# Patient Record
Sex: FEMALE | Race: ASIAN | Employment: FULL TIME | URBAN - METROPOLITAN AREA
[De-identification: names, ages, dates, MRNs, and addresses within clinical notes are randomized per-mention and may not be internally consistent; named-entity substitution may affect disease eponyms.]

---

## 2017-10-16 ENCOUNTER — HOSPITAL ENCOUNTER (OUTPATIENT)
Dept: MAMMOGRAPHY | Age: 42
Discharge: HOME | End: 2017-10-16
Attending: FAMILY MEDICINE
Payer: OTHER GOVERNMENT

## 2017-10-16 DIAGNOSIS — Z12.31 VISIT FOR SCREENING MAMMOGRAM: ICD-10-CM

## 2017-10-16 PROCEDURE — 77067 SCR MAMMO BI INCL CAD: CPT

## 2017-11-13 ENCOUNTER — OFFICE VISIT (OUTPATIENT)
Dept: FAMILY MEDICINE CLINIC | Age: 42
End: 2017-11-13

## 2017-11-13 VITALS
HEART RATE: 76 BPM | RESPIRATION RATE: 16 BRPM | TEMPERATURE: 98.2 F | OXYGEN SATURATION: 99 % | HEIGHT: 61 IN | SYSTOLIC BLOOD PRESSURE: 100 MMHG | DIASTOLIC BLOOD PRESSURE: 62 MMHG | WEIGHT: 126 LBS | BODY MASS INDEX: 23.79 KG/M2

## 2017-11-13 DIAGNOSIS — R42 VERTIGO: ICD-10-CM

## 2017-11-13 DIAGNOSIS — Z13.1 SCREENING FOR DIABETES MELLITUS (DM): ICD-10-CM

## 2017-11-13 DIAGNOSIS — J01.00 ACUTE NON-RECURRENT MAXILLARY SINUSITIS: Primary | ICD-10-CM

## 2017-11-13 DIAGNOSIS — R51.9 FACIAL PAIN: ICD-10-CM

## 2017-11-13 DIAGNOSIS — Z13.220 SCREENING FOR LIPID DISORDERS: ICD-10-CM

## 2017-11-13 RX ORDER — AMOXICILLIN AND CLAVULANATE POTASSIUM 875; 125 MG/1; MG/1
1 TABLET, FILM COATED ORAL EVERY 12 HOURS
Qty: 20 TAB | Refills: 0 | Status: SHIPPED | OUTPATIENT
Start: 2017-11-13 | End: 2017-11-23

## 2017-11-13 RX ORDER — FLUTICASONE PROPIONATE 50 MCG
SPRAY, SUSPENSION (ML) NASAL
Qty: 1 BOTTLE | Refills: 0 | Status: SHIPPED | OUTPATIENT
Start: 2017-11-13

## 2017-11-13 RX ORDER — DOXYCYCLINE 100 MG/1
CAPSULE ORAL
COMMUNITY
Start: 2017-11-08

## 2017-11-13 RX ORDER — MECLIZINE HYDROCHLORIDE 25 MG/1
25 TABLET ORAL
Qty: 12 TAB | Refills: 0 | Status: SHIPPED | OUTPATIENT
Start: 2017-11-13 | End: 2017-11-23

## 2017-11-13 NOTE — PROGRESS NOTES
1. Have you been to the ER, urgent care clinic since your last visit? Hospitalized since your last visit? Yes [facility] 11/10/2017    2. Have you seen or consulted any other health care providers outside of the [address] [place] [address] since your last visit? Include any pap smears or colon screening.  No

## 2017-11-13 NOTE — PROGRESS NOTES
Bettejane Sandhoff, 43 y.o.,  female    SUBJECTIVE  Left sided face pain x 1 week    Reports feeling pressure on L frontal and maxillary sinuses, nasal congestion no discharge, feeling underwater. No fever, cough. Says similar symptoms last year responded to flonase. [facility] reviewed note given doxy and flu vaccine. Says feeling worse, developed dizziness, sensation of imbalance, room spinning past 2 days. ROS:  See HPI, all others negative        Patient Active Problem List   Diagnosis Code   (none) - all problems resolved or deleted       Current Outpatient Prescriptions   Medication Sig Dispense Refill    doxycycline (VIBRAMYCIN) 100 mg capsule       amoxicillin-clavulanate (AUGMENTIN) 875-125 mg per tablet Take 1 Tab by mouth every twelve (12) hours for 10 days. 20 Tab 0    fluticasone (FLONASE) 50 mcg/actuation nasal spray 2 sprays each nostril once daily 1 Bottle 0    meclizine (ANTIVERT) 25 mg tablet Take 1 Tab by mouth three (3) times daily as needed for up to 10 days. 12 Tab 0    guaiFENesin-codeine (ROBITUSSIN AC) 100-10 mg/5 mL solution Take 1-2 teaspoons by mouth every 4 hours PRN for cough      butalbital-aspirin-caffeine (FIORINAL) capsule Take  by mouth every four (4) hours as needed for Pain. Take 1-2 tabs      ibuprofen (MOTRIN) 600 mg tablet Take 1 Tab by mouth every eight (8) hours as needed for Pain. Indications: PAIN 15 Tab 0    predniSONE (STERAPRED) 5 mg dose pack See administration instruction per 5mg dose pack 21 Tab 0    vit B Cmplx 3-FA-Vit C-Biotin (NEPHRO CARLOS MANUEL RX) 1- mg-mg-mcg tablet Take 1 Tab by mouth daily.  Glucosamine &Chondroit-MV-Min3 860-303-72-0.5 mg tab Take  by mouth.          No Known Allergies    Past Medical History:   Diagnosis Date    DM (diabetes mellitus), gestational, postpartum condition 2010       Social History     Social History    Marital status:      Spouse name: N/A    Number of children: N/A    Years of education: N/A     Occupational History    Not on file. Social History Main Topics    Smoking status: Former Smoker     Packs/day: 1.00     Years: 5.00     Quit date: 4/1/2005    Smokeless tobacco: Never Used    Alcohol use Yes      Comment: occ    Drug use: No    Sexual activity: Yes     Partners: Male     Other Topics Concern    Not on file     Social History Narrative       Family History   Problem Relation Age of Onset    Diabetes Mother     Cancer Mother      pancreatatic    Hypertension Father          OBJECTIVE    Physical Exam:     Visit Vitals    /62 (BP 1 Location: Left arm, BP Patient Position: Sitting)    Pulse 76    Temp 98.2 °F (36.8 °C) (Oral)    Resp 16    Ht 5' 1\" (1.549 m)    Wt 126 lb (57.2 kg)    LMP 11/07/2017 (Exact Date)    SpO2 99%    BMI 23.81 kg/m2       General: alert, mildly ill-appearing,  in no apparent distress or pain  Head: atraumatic. + tender L maxillary and frontal sinuses  Eyes: Lids with no discharge, no matting, conjunctivae clear and non injected, full EOMs, PERLLA  Ears: pinna non-tender, external auditory canal patent, TM intact  Mouth/throat:tonsils non enlarged, pharynx non erythematous and no lesion, nasal mucosa boggy  Neck: supple, no adenopathy palpated  CVS: normal rate, regular rhythm, distinct S1 and S2  Lungs:clear to ausculation bilaterally, no crackles, wheezing or rhonchi noted  Skin: warm, no lesions, rashes noted  Psych:  mood and affect normal        ASSESSMENT/PLAN  Diagnoses and all orders for this visit:    1. Acute non-recurrent maxillary sinusitis  Persistent  Stop doxy,  Switch to-     amoxicillin-clavulanate (AUGMENTIN) 875-125 mg per tablet; Take 1 Tab by mouth every twelve (12) hours for 10 days. Start:  -     fluticasone (FLONASE) 50 mcg/actuation nasal spray; 2 sprays each nostril once daily  Consider allergic rhinitis with timing of symptoms, advised flonase/zyrtec by fall season.      2. Screening for diabetes mellitus (DM)  -     METABOLIC PANEL, COMPREHENSIVE; Future  -     HEMOGLOBIN A1C W/O EAG; Future    3. Screening for lipid disorders  -     LIPID PANEL; Future    4. Vertigo  -     meclizine (ANTIVERT) 25 mg tablet; Take 1 Tab by mouth three (3) times daily as needed for up to 10 days. 5. Facial pain      Follow-up Disposition:  Return in about 1 week (around 11/20/2017), or plan for CPE then. .      Patient understands plan of care. Patient has provided input and agrees with goals.

## 2017-11-13 NOTE — MR AVS SNAPSHOT
Visit Information Date & Time Provider Department Dept. Phone Encounter #  
 11/13/2017  1:00 PM Gabriel Mike, [address] [id number] Follow-up Instructions Return in about 1 week (around 11/20/2017), or plan for CPE then. Meir Edvinedmundos Upcoming Health Maintenance Date Due  
 PAP AKA CERVICAL CYTOLOGY 9/18/2018 DTaP/Tdap/Td series (2 - Td) 8/6/2023 Allergies as of 11/13/2017  Review Complete On: 12/6/2016 By: Yaw Painting LPN No Known Allergies Current Immunizations  Reviewed on 11/1/2016 Name Date Influenza Vaccine (Quad) PF 11/1/2016, 9/18/2015 TB Skin Test (PPD) Intradermal 3/18/2016 Tdap 8/6/2013 Not reviewed this visit You Were Diagnosed With   
  
 Codes Comments Acute non-recurrent maxillary sinusitis    -  Primary ICD-10-CM: J01.00 ICD-9-CM: 461.0 Screening for diabetes mellitus (DM)     ICD-10-CM: Z13.1 ICD-9-CM: V77.1 Screening for lipid disorders     ICD-10-CM: Z13.220 ICD-9-CM: V77.91 Vertigo     ICD-10-CM: T22 ICD-9-CM: 780.4 Vitals BP Pulse Temp Resp Height(growth percentile) Weight(growth percentile) 100/62 (BP 1 Location: Left arm, BP Patient Position: Sitting) 76 98.2 °F (36.8 °C) (Oral) 16 5' 1\" (1.549 m) 126 lb (57.2 kg) LMP SpO2 BMI OB Status Smoking Status 11/07/2017 (Exact Date) 99% 23.81 kg/m2 Having regular periods Former Smoker Vitals History BMI and BSA Data Body Mass Index Body Surface Area  
 23.81 kg/m 2 1.57 m 2 Preferred Pharmacy Pharmacy Name Phone [id number] - [address], [address] [place] [address] [phone] Your Updated Medication List  
  
   
This list is accurate as of: 11/13/17  1:16 PM.  Always use your most recent med list.  
  
  
  
  
 amoxicillin-clavulanate 875-125 mg per tablet Commonly known as:  AUGMENTIN Take 1 Tab by mouth every twelve (12) hours for 10 days. doxycycline 100 mg capsule Commonly known as:  VIBRAMYCIN  
  
 FIORINAL capsule Generic drug:  butalbital-aspirin-caffeine Take  by mouth every four (4) hours as needed for Pain. Take 1-2 tabs  
  
 fluticasone 50 mcg/actuation nasal spray Commonly known as:  FLONASE  
2 sprays each nostril once daily Glucosamine &Chondroit-MV-Min3 151-999-03-0.5 mg Tab Take  by mouth.  
  
 guaiFENesin-codeine 100-10 mg/5 mL solution Commonly known as:  ROBITUSSIN AC Take 1-2 teaspoons by mouth every 4 hours PRN for cough  
  
 ibuprofen 600 mg tablet Commonly known as:  MOTRIN Take 1 Tab by mouth every eight (8) hours as needed for Pain. Indications: PAIN  
  
 meclizine 25 mg tablet Commonly known as:  ANTIVERT Take 1 Tab by mouth three (3) times daily as needed for up to 10 days. predniSONE 5 mg dose pack Commonly known as:  STERAPRED See administration instruction per 5mg dose pack  
  
 vit B Cmplx 3-FA-Vit C-Biotin 1- mg-mg-mcg tablet Commonly known as:  NEPHRO CARLOS MANUEL RX Take 1 Tab by mouth daily. Prescriptions Sent to Pharmacy Refills  
 amoxicillin-clavulanate (AUGMENTIN) 875-125 mg per tablet 0 Sig: Take 1 Tab by mouth every twelve (12) hours for 10 days. Class: Normal  
 Pharmacy: [address] at [address] Ph #: [phone] Route: Oral  
 fluticasone (FLONASE) 50 mcg/actuation nasal spray 0 Si sprays each nostril once daily Class: Normal  
 Pharmacy: [address] at [address] [place] [address] Ph #: [phone]  
 meclizine (ANTIVERT) 25 mg tablet 0 Sig: Take 1 Tab by mouth three (3) times daily as needed for up to 10 days. Class: Normal  
 Pharmacy: [address] at [address] Ph #: [phone] Route: Oral  
  
Follow-up Instructions Return in about 1 week (around 11/20/2017), or plan for CPE then. Brant Prieto To-Do List   
 11/13/2017 Lab:  HEMOGLOBIN A1C W/O EAG   
  
 11/13/2017 Lab:  LIPID PANEL   
  
 11/13/2017 Lab:  METABOLIC PANEL, COMPREHENSIVE Patient Instructions Sinusitis: Care Instructions Your Care Instructions Sinusitis is an infection of the lining of the sinus cavities in your head. Sinusitis often follows a cold. It causes pain and pressure in your head and face. In most cases, sinusitis gets better on its own in 1 to 2 weeks. But some mild symptoms may last for several weeks. Sometimes antibiotics are needed. Follow-up care is a key part of your treatment and safety. Be sure to make and go to all appointments, and call your doctor if you are having problems. It's also a good idea to know your test results and keep a list of the medicines you take. How can you care for yourself at home? · Take an over-the-counter pain medicine, such as acetaminophen (Tylenol), ibuprofen (Advil, Motrin), or naproxen (Aleve). Read and follow all instructions on the label. · If the doctor prescribed antibiotics, take them as directed. Do not stop taking them just because you feel better. You need to take the full course of antibiotics. · Be careful when taking over-the-counter cold or flu medicines and Tylenol at the same time. Many of these medicines have acetaminophen, which is Tylenol. Read the labels to make sure that you are not taking more than the recommended dose. Too much acetaminophen (Tylenol) can be harmful. · Breathe warm, moist air from a steamy shower, a hot bath, or a sink filled with hot water. Avoid cold, dry air. Using a humidifier in your home may help. Follow the directions for cleaning the machine. · Use saline (saltwater) nasal washes to help keep your nasal passages open and wash out mucus and bacteria.  You can buy saline nose drops at a grocery store or drugstore. Or you can make your own at home by adding 1 teaspoon of salt and 1 teaspoon of baking soda to 2 cups of distilled water. If you make your own, fill a bulb syringe with the solution, insert the tip into your nostril, and squeeze gently. Joe Lien your nose. · Put a hot, wet towel or a warm gel pack on your face 3 or 4 times a day for 5 to 10 minutes each time. · Try a decongestant nasal spray like oxymetazoline (Afrin). Do not use it for more than 3 days in a row. Using it for more than 3 days can make your congestion worse. When should you call for help? Call your doctor now or seek immediate medical care if: 
? · You have new or worse swelling or redness in your face or around your eyes. ? · You have a new or higher fever. ? Watch closely for changes in your health, and be sure to contact your doctor if: 
? · You have new or worse facial pain. ? · The mucus from your nose becomes thicker (like pus) or has new blood in it. ? · You are not getting better as expected. Where can you learn more? Go to http://dennis-lalitha.info/. Enter G600 in the search box to learn more about \"Sinusitis: Care Instructions. \" Current as of: May 12, 2017 Content Version: 11.4 © 6434-3122 [employer]. Care instructions adapted under license by [employer] (which disclaims liability or warranty for this information). If you have questions about a medical condition or this instruction, always ask your healthcare professional. [place] [address] any warranty or liability for your use of this information. Introducing [facility]! Ana Lilia Ornleas introduces [employer] patient portal. Now you can access parts of your medical record, email your doctor's office, and request medication refills online. 1. In your internet browser, go to [email or web address] 2. Click on the First Time User? Click Here link in the Sign In box. You will see the New Member Sign Up page. 3. Enter your [place] Access Code exactly as it appears below. You will not need to use this code after youve completed the sign-up process. If you do not sign up before the expiration date, you must request a new code. · [place] Access Code: [email or web address] Expires: 12/19/2017  3:55 PM 
 
4. Enter the last four digits of your Social Security Number (xxxx) and Date of Birth (mm/dd/yyyy) as indicated and click Submit. You will be taken to the next sign-up page. 5. Create a [place] ID. This will be your [place] login ID and cannot be changed, so think of one that is secure and easy to remember. 6. Create a [place] password. You can change your password at any time. 7. Enter your Password Reset Question and Answer. This can be used at a later time if you forget your password. 8. Enter your e-mail address. You will receive e-mail notification when new information is available in [address] 9. Click Sign Up. You can now view and download portions of your medical record. 10. Click the Download Summary menu link to download a portable copy of your medical information. If you have questions, please visit the Frequently Asked Questions section of the [place] website. Remember, [place] is NOT to be used for urgent needs. For medical emergencies, dial 911. Now available from your iPhone and Android! Please provide this summary of care documentation to your next provider. Your primary care clinician is listed as Aaron Porter. If you have any questions after today's visit, please call [phone].

## 2017-11-13 NOTE — PATIENT INSTRUCTIONS
Sinusitis: Care Instructions  Your Care Instructions    Sinusitis is an infection of the lining of the sinus cavities in your head. Sinusitis often follows a cold. It causes pain and pressure in your head and face. In most cases, sinusitis gets better on its own in 1 to 2 weeks. But some mild symptoms may last for several weeks. Sometimes antibiotics are needed. Follow-up care is a key part of your treatment and safety. Be sure to make and go to all appointments, and call your doctor if you are having problems. It's also a good idea to know your test results and keep a list of the medicines you take. How can you care for yourself at home? · Take an over-the-counter pain medicine, such as acetaminophen (Tylenol), ibuprofen (Advil, Motrin), or naproxen (Aleve). Read and follow all instructions on the label. · If the doctor prescribed antibiotics, take them as directed. Do not stop taking them just because you feel better. You need to take the full course of antibiotics. · Be careful when taking over-the-counter cold or flu medicines and Tylenol at the same time. Many of these medicines have acetaminophen, which is Tylenol. Read the labels to make sure that you are not taking more than the recommended dose. Too much acetaminophen (Tylenol) can be harmful. · Breathe warm, moist air from a steamy shower, a hot bath, or a sink filled with hot water. Avoid cold, dry air. Using a humidifier in your home may help. Follow the directions for cleaning the machine. · Use saline (saltwater) nasal washes to help keep your nasal passages open and wash out mucus and bacteria. You can buy saline nose drops at a grocery store or drugstore. Or you can make your own at home by adding 1 teaspoon of salt and 1 teaspoon of baking soda to 2 cups of distilled water. If you make your own, fill a bulb syringe with the solution, insert the tip into your nostril, and squeeze gently. Lorn Hush your nose.   · Put a hot, wet towel or a warm gel pack on your face 3 or 4 times a day for 5 to 10 minutes each time. · Try a decongestant nasal spray like oxymetazoline (Afrin). Do not use it for more than 3 days in a row. Using it for more than 3 days can make your congestion worse. When should you call for help? Call your doctor now or seek immediate medical care if:  ? · You have new or worse swelling or redness in your face or around your eyes. ? · You have a new or higher fever. ? Watch closely for changes in your health, and be sure to contact your doctor if:  ? · You have new or worse facial pain. ? · The mucus from your nose becomes thicker (like pus) or has new blood in it. ? · You are not getting better as expected. Where can you learn more? Go to http://dennis-lalitha.info/. Enter A011 in the search box to learn more about \"Sinusitis: Care Instructions. \"  Current as of: May 12, 2017  Content Version: 11.4  © 6822-7068 [employer]. Care instructions adapted under license by [employer] (which disclaims liability or warranty for this information). If you have questions about a medical condition or this instruction, always ask your healthcare professional. [address] any warranty or liability for your use of this information.

## 2017-11-13 NOTE — PROGRESS NOTES
Patient identified with 2 identifiers (name and ).   Patient aware she needs to schedule WWE for 2017

## 2017-11-14 ENCOUNTER — HOSPITAL ENCOUNTER (OUTPATIENT)
Dept: LAB | Age: 42
Discharge: HOME | End: 2017-11-14
Payer: OTHER GOVERNMENT

## 2017-11-14 LAB
ALBUMIN SERPL-MCNC: 3.5 G/DL (ref 3.4–5)
ALBUMIN/GLOB SERPL: 0.9 {RATIO} (ref 0.8–1.7)
ALP SERPL-CCNC: 53 U/L (ref 45–117)
ALT SERPL-CCNC: 41 U/L (ref 13–56)
ANION GAP SERPL CALC-SCNC: 7 MMOL/L (ref 3–18)
AST SERPL-CCNC: 24 U/L (ref 15–37)
BILIRUB SERPL-MCNC: 0.5 MG/DL (ref 0.2–1)
BUN SERPL-MCNC: 12 MG/DL (ref 7–18)
BUN/CREAT SERPL: 20 (ref 12–20)
CALCIUM SERPL-MCNC: 8.5 MG/DL (ref 8.5–10.1)
CHLORIDE SERPL-SCNC: 101 MMOL/L (ref 100–108)
CHOLEST SERPL-MCNC: 195 MG/DL
CO2 SERPL-SCNC: 30 MMOL/L (ref 21–32)
CREAT SERPL-MCNC: 0.61 MG/DL (ref 0.6–1.3)
GLOBULIN SER CALC-MCNC: 3.8 G/DL (ref 2–4)
GLUCOSE SERPL-MCNC: 79 MG/DL (ref 74–99)
HBA1C MFR BLD: 5.5 % (ref 4.2–5.6)
HDLC SERPL-MCNC: 64 MG/DL (ref 40–60)
HDLC SERPL: 3 {RATIO} (ref 0–5)
LDLC SERPL CALC-MCNC: 103.6 MG/DL (ref 0–100)
LIPID PROFILE,FLP: ABNORMAL
POTASSIUM SERPL-SCNC: 4.4 MMOL/L (ref 3.5–5.5)
PROT SERPL-MCNC: 7.3 G/DL (ref 6.4–8.2)
SODIUM SERPL-SCNC: 138 MMOL/L (ref 136–145)
TRIGL SERPL-MCNC: 137 MG/DL (ref ?–150)
VLDLC SERPL CALC-MCNC: 27.4 MG/DL

## 2017-11-14 PROCEDURE — 83036 HEMOGLOBIN GLYCOSYLATED A1C: CPT | Performed by: FAMILY MEDICINE

## 2017-11-14 PROCEDURE — 80053 COMPREHEN METABOLIC PANEL: CPT | Performed by: FAMILY MEDICINE

## 2017-11-14 PROCEDURE — 36415 COLL VENOUS BLD VENIPUNCTURE: CPT | Performed by: FAMILY MEDICINE

## 2017-11-14 PROCEDURE — 80061 LIPID PANEL: CPT | Performed by: FAMILY MEDICINE

## 2017-11-20 ENCOUNTER — HOSPITAL ENCOUNTER (OUTPATIENT)
Dept: LAB | Age: 42
Discharge: HOME | End: 2017-11-20
Payer: OTHER GOVERNMENT

## 2017-11-20 ENCOUNTER — OFFICE VISIT (OUTPATIENT)
Dept: FAMILY MEDICINE CLINIC | Age: 42
End: 2017-11-20

## 2017-11-20 VITALS
TEMPERATURE: 97 F | OXYGEN SATURATION: 99 % | HEIGHT: 61 IN | SYSTOLIC BLOOD PRESSURE: 96 MMHG | DIASTOLIC BLOOD PRESSURE: 52 MMHG | BODY MASS INDEX: 23.79 KG/M2 | WEIGHT: 126 LBS | HEART RATE: 66 BPM | RESPIRATION RATE: 16 BRPM

## 2017-11-20 DIAGNOSIS — Z12.4 SCREENING FOR MALIGNANT NEOPLASM OF CERVIX: ICD-10-CM

## 2017-11-20 DIAGNOSIS — Z01.419 WELL WOMAN EXAM WITH ROUTINE GYNECOLOGICAL EXAM: Primary | ICD-10-CM

## 2017-11-20 PROCEDURE — 88142 CYTOPATH C/V THIN LAYER: CPT | Performed by: FAMILY MEDICINE

## 2017-11-20 PROCEDURE — 87624 HPV HI-RISK TYP POOLED RSLT: CPT | Performed by: FAMILY MEDICINE

## 2017-11-20 NOTE — PATIENT INSTRUCTIONS

## 2017-11-20 NOTE — PROGRESS NOTES
1. Have you been to the ER, urgent care clinic since your last visit? Hospitalized since your last visit? No    2. Have you seen or consulted any other health care providers outside of the [address] [place] [address] since your last visit? Include any pap smears or colon screening. No    LMP;11/07/2017  Contraception type:no  Vaginal problems:no  Last mammogram:10/16/2017  Last Pap:09/18/2015  Last Tdap:08/06/2013  Family hx of ovarian ca. no  Family hx of breast ca:M cousin  Family hx of colon ca:no

## 2017-11-20 NOTE — PROGRESS NOTES
Subjective:   43 y.o. female for Well Woman Check. Patient's last menstrual period was 11/07/2017 (exact date). Social History: not sexually active. Pertinent past medical hstory: no history of HTN, DVT, CAD, DM, liver disease, migraines or smoking. Sinus pressure/dizziness has improved    Past Medical History:   Diagnosis Date    DM (diabetes mellitus), gestational, postpartum condition 2010     Past Surgical History:   Procedure Laterality Date    HX GYN      2 preg. Family History   Problem Relation Age of Onset   Hershell Rochelle Diabetes Mother     Cancer Mother      pancreatatic    Hypertension Father      Social History   Substance Use Topics    Smoking status: Former Smoker     Packs/day: 1.00     Years: 5.00     Quit date: 4/1/2005    Smokeless tobacco: Never Used    Alcohol use Yes      Comment: occ        ROS:  Feeling well. No dyspnea or chest pain on exertion. No abdominal pain, change in bowel habits, black or bloody stools. No urinary tract symptoms. GYN ROS: normal menses, no abnormal bleeding, pelvic pain or discharge, no breast pain or new or enlarging lumps on self exam. No neurological complaints. Objective:     Visit Vitals    BP 96/52 (BP 1 Location: Left arm, BP Patient Position: Sitting)    Pulse 66    Temp 97 °F (36.1 °C) (Oral)    Resp 16    Ht 5' 1\" (1.549 m)    Wt 126 lb (57.2 kg)    LMP 11/07/2017 (Exact Date)    SpO2 99%    BMI 23.81 kg/m2     The patient appears well, alert, oriented x 3, in no distress. ENT normal.  Neck supple. No adenopathy or thyromegaly. HARVEY. Lungs are clear, good air entry, no wheezes, rhonchi or rales. S1 and S2 normal, no murmurs, regular rate and rhythm. Abdomen soft without tenderness, guarding, mass or organomegaly. Extremities show no edema, normal peripheral pulses. Neurological is normal, no focal findings.     BREAST EXAM: breasts appear normal, no suspicious masses, no skin or nipple changes or axillary nodes    PELVIC EXAM: normal external genitalia, vulva, vagina, cervix, uterus and adnexa  Results for orders placed or performed during the hospital encounter of 11/14/17   LIPID PANEL   Result Value Ref Range    LIPID PROFILE          Cholesterol, total 195 <200 MG/DL    Triglyceride 137 <150 MG/DL    HDL Cholesterol 64 (H) 40 - 60 MG/DL    LDL, calculated 103.6 (H) 0 - 100 MG/DL    VLDL, calculated 27.4 MG/DL    CHOL/HDL Ratio 3.0 0 - 5.0     METABOLIC PANEL, COMPREHENSIVE   Result Value Ref Range    Sodium 138 136 - 145 mmol/L    Potassium 4.4 3.5 - 5.5 mmol/L    Chloride 101 100 - 108 mmol/L    CO2 30 21 - 32 mmol/L    Anion gap 7 3.0 - 18 mmol/L    Glucose 79 74 - 99 mg/dL    BUN 12 7.0 - 18 MG/DL    Creatinine 0.61 0.6 - 1.3 MG/DL    BUN/Creatinine ratio 20 12 - 20      GFR est AA >60 >60 ml/min/1.73m2    GFR est non-AA >60 >60 ml/min/1.73m2    Calcium 8.5 8.5 - 10.1 MG/DL    Bilirubin, total 0.5 0.2 - 1.0 MG/DL    ALT (SGPT) 41 13 - 56 U/L    AST (SGOT) 24 15 - 37 U/L    Alk. phosphatase 53 45 - 117 U/L    Protein, total 7.3 6.4 - 8.2 g/dL    Albumin 3.5 3.4 - 5.0 g/dL    Globulin 3.8 2.0 - 4.0 g/dL    A-G Ratio 0.9 0.8 - 1.7     HEMOGLOBIN A1C W/O EAG   Result Value Ref Range    Hemoglobin A1c 5.5 4.2 - 5.6 %       Assessment/Plan:   Diagnoses and all orders for this visit:    1. Well woman exam with routine gynecological exam  well woman  Mammogram-update 10/18  pap smear-done today  return annually or prn  reviewed diet, exercise and weight control. Jack cv risk 0.3%    2. Screening for malignant neoplasm of cervix  -     PAP LB, HPV RFX HPV 16&18,45([id number]); Future    Advised to establish care with PCP in [place] upon relocation. Patient/guardian understands plan of care. Patient has provided input and agrees with goals. Future labs to be discussed on next visit.

## 2017-11-20 NOTE — MR AVS SNAPSHOT
Visit Information Date & Time Provider Department Dept. Phone Encounter #  
 11/20/2017  2:15 PM Adwoa Barone, [address] [id number] Upcoming Health Maintenance Date Due  
 PAP AKA CERVICAL CYTOLOGY 9/18/2018 DTaP/Tdap/Td series (2 - Td) 8/6/2023 Allergies as of 11/20/2017  Review Complete On: 11/20/2017 By: Chrissie Segal LPN No Known Allergies Current Immunizations  Reviewed on 11/1/2016 Name Date Influenza Vaccine 11/8/2017 Influenza Vaccine (Quad) PF 11/1/2016, 9/18/2015 TB Skin Test (PPD) Intradermal 3/18/2016 Tdap 8/6/2013 Not reviewed this visit You Were Diagnosed With   
  
 Codes Comments Well woman exam with routine gynecological exam    -  Primary ICD-10-CM: V36.989 ICD-9-CM: V72.31 Screening for malignant neoplasm of cervix     ICD-10-CM: Z12.4 ICD-9-CM: V76.2 Vitals BP Pulse Temp Resp Height(growth percentile) Weight(growth percentile) 96/52 (BP 1 Location: Left arm, BP Patient Position: Sitting) 66 97 °F (36.1 °C) (Oral) 16 5' 1\" (1.549 m) 126 lb (57.2 kg) LMP SpO2 BMI OB Status Smoking Status 11/07/2017 (Exact Date) 99% 23.81 kg/m2 Having regular periods Former Smoker Vitals History BMI and BSA Data Body Mass Index Body Surface Area  
 23.81 kg/m 2 1.57 m 2 Preferred Pharmacy Pharmacy Name Phone [id number] - [place]oltz, [address] [place] [address] [phone] Your Updated Medication List  
  
   
This list is accurate as of: 11/20/17  2:33 PM.  Always use your most recent med list.  
  
  
  
  
 amoxicillin-clavulanate 875-125 mg per tablet Commonly known as:  AUGMENTIN Take 1 Tab by mouth every twelve (12) hours for 10 days. doxycycline 100 mg capsule Commonly known as:  VIBRAMYCIN  
  
 FIORINAL capsule Generic drug:  butalbital-aspirin-caffeine Take  by mouth every four (4) hours as needed for Pain. Take 1-2 tabs  
  
 fluticasone 50 mcg/actuation nasal spray Commonly known as:  FLONASE  
2 sprays each nostril once daily Glucosamine &Chondroit-MV-Min3 881-700-71-0.5 mg Tab Take  by mouth.  
  
 guaiFENesin-codeine 100-10 mg/5 mL solution Commonly known as:  ROBITUSSIN AC Take 1-2 teaspoons by mouth every 4 hours PRN for cough  
  
 ibuprofen 600 mg tablet Commonly known as:  MOTRIN Take 1 Tab by mouth every eight (8) hours as needed for Pain. Indications: PAIN  
  
 meclizine 25 mg tablet Commonly known as:  ANTIVERT Take 1 Tab by mouth three (3) times daily as needed for up to 10 days. predniSONE 5 mg dose pack Commonly known as:  STERAPRED See administration instruction per 5mg dose pack  
  
 vit B Cmplx 3-FA-Vit C-Biotin 1- mg-mg-mcg tablet Commonly known as:  NEPHRO CARLOS MANUEL RX Take 1 Tab by mouth daily. To-Do List   
 11/20/2017 Pathology:  PAP LB, HPV RFX HPV 16&18,45([id number]) Patient Instructions Heart-Healthy Diet: Care Instructions Your Care Instructions A heart-healthy diet has lots of vegetables, fruits, nuts, beans, and whole grains, and is low in salt. It limits foods that are high in saturated fat, such as meats, cheeses, and fried foods. It may be hard to change your diet, but even small changes can lower your risk of heart attack and heart disease. Follow-up care is a key part of your treatment and safety. Be sure to make and go to all appointments, and call your doctor if you are having problems. It's also a good idea to know your test results and keep a list of the medicines you take. How can you care for yourself at home? Watch your portions · Learn what a serving is. A \"serving\" and a \"portion\" are not always the same thing. Make sure that you are not eating larger portions than are recommended. For example, a serving of pasta is ½ cup.  A serving size of meat is 2 to 3 ounces. A 3-ounce serving is about the size of a deck of cards. Measure serving sizes until you are good at [place]" them. Keep in mind that restaurants often serve portions that are 2 or 3 times the size of one serving. · To keep your energy level up and keep you from feeling hungry, eat often but in smaller portions. · Eat only the number of calories you need to stay at a healthy weight. If you need to lose weight, eat fewer calories than your body burns (through exercise and other physical activity). Eat more fruits and vegetables · Eat a variety of fruit and vegetables every day. Dark green, deep orange, red, or yellow fruits and vegetables are especially good for you. Examples include spinach, carrots, peaches, and berries. · Keep carrots, celery, and other veggies handy for snacks. Buy fruit that is in season and store it where you can see it so that you will be tempted to eat it. · Cook dishes that have a lot of veggies in them, such as stir-fries and soups. Limit saturated and trans fat · Read food labels, and try to avoid saturated and trans fats. They increase your risk of heart disease. Trans fat is found in many processed foods such as cookies and crackers. · Use olive or canola oil when you cook. Try cholesterol-lowering spreads, such as Benecol or Take Control. · Bake, broil, grill, or steam foods instead of frying them. · Choose lean meats instead of high-fat meats such as hot dogs and sausages. Cut off all visible fat when you prepare meat. · Eat fish, skinless poultry, and meat alternatives such as soy products instead of high-fat meats. Soy products, such as tofu, may be especially good for your heart. · Choose low-fat or fat-free milk and dairy products. Eat fish · Eat at least two servings of fish a week. Certain fish, such as salmon and tuna, contain omega-3 fatty acids, which may help reduce your risk of heart attack. Eat foods high in fiber · Eat a variety of grain products every day. Include whole-grain foods that have lots of fiber and nutrients. Examples of whole-grain foods include oats, whole wheat bread, and brown rice. · Buy whole-grain breads and cereals, instead of white bread or pastries. Limit salt and sodium · Limit how much salt and sodium you eat to help lower your blood pressure. · Taste food before you salt it. Add only a little salt when you think you need it. With time, your taste buds will adjust to less salt. · Eat fewer snack items, fast foods, and other high-salt, processed foods. Check food labels for the amount of sodium in packaged foods. · Choose low-sodium versions of canned goods (such as soups, vegetables, and beans). Limit sugar · Limit drinks and foods with added sugar. These include candy, desserts, and soda pop. Limit alcohol · Limit alcohol to no more than 2 drinks a day for men and 1 drink a day for women. Too much alcohol can cause health problems. When should you call for help? Watch closely for changes in your health, and be sure to contact your doctor if: 
? · You would like help planning heart-healthy meals. Where can you learn more? Go to http://dennisBroadcast.comlalitha.info/. Enter V137 in the search box to learn more about \"Heart-Healthy Diet: Care Instructions. \" Current as of: September 21, 2016 Content Version: 11.4 © 3646-6185 [employer]. Care instructions adapted under license by [employer] (which disclaims liability or warranty for this information). If you have questions about a medical condition or this instruction, always ask your healthcare professional. [address] any warranty or liability for your use of this information. Introducing [facility]! Kota Castillo introduces [employer] patient portal. Now you can access parts of your medical record, email your doctor's office, and request medication refills online. 1. In your internet browser, go to [email or web address] 2. Click on the First Time User? Click Here link in the Sign In box. You will see the New Member Sign Up page. 3. Enter your [employer] Access Code exactly as it appears below. You will not need to use this code after youve completed the sign-up process. If you do not sign up before the expiration date, you must request a new code. · [employer] Access Code: [email or web address] Expires: 12/19/2017  3:55 PM 
 
4. Enter the last four digits of your Social Security Number (xxxx) and Date of Birth (mm/dd/yyyy) as indicated and click Submit. You will be taken to the next sign-up page. 5. Create a [employer] ID. This will be your [employer] login ID and cannot be changed, so think of one that is secure and easy to remember. 6. Create a [employer] password. You can change your password at any time. 7. Enter your Password Reset Question and Answer. This can be used at a later time if you forget your password. 8. Enter your e-mail address. You will receive e-mail notification when new information is available in [address] 9. Click Sign Up. You can now view and download portions of your medical record. 10. Click the Download Summary menu link to download a portable copy of your medical information. If you have questions, please visit the Frequently Asked Questions section of the [employer] website. Remember, [employer] is NOT to be used for urgent needs. For medical emergencies, dial 911. Now available from your iPhone and Android! Please provide this summary of care documentation to your next provider. Your primary care clinician is listed as Sabrina Newell. If you have any questions after today's visit, please call [phone].

## 2017-11-24 NOTE — PROGRESS NOTES
Normal pap, HPV negative. Recommend repeat pap in 3 years. And annual health maintenance visit. Pls notify pt mail.

## 2017-11-27 NOTE — PROGRESS NOTES
Patient identified with 2 identifiers (name and ).   Patient aware of normal pap negative HPV and continue to be seen yearly